# Patient Record
Sex: FEMALE | Race: WHITE | NOT HISPANIC OR LATINO
[De-identification: names, ages, dates, MRNs, and addresses within clinical notes are randomized per-mention and may not be internally consistent; named-entity substitution may affect disease eponyms.]

---

## 2020-11-09 ENCOUNTER — TRANSCRIPTION ENCOUNTER (OUTPATIENT)
Age: 35
End: 2020-11-09

## 2022-07-07 PROBLEM — Z00.00 ENCOUNTER FOR PREVENTIVE HEALTH EXAMINATION: Status: ACTIVE | Noted: 2022-07-07

## 2022-07-25 ENCOUNTER — ASOB RESULT (OUTPATIENT)
Age: 37
End: 2022-07-25

## 2022-07-25 ENCOUNTER — APPOINTMENT (OUTPATIENT)
Dept: ANTEPARTUM | Facility: CLINIC | Age: 37
End: 2022-07-25

## 2022-07-25 PROCEDURE — 76811 OB US DETAILED SNGL FETUS: CPT

## 2022-08-04 ENCOUNTER — OUTPATIENT (OUTPATIENT)
Dept: OUTPATIENT SERVICES | Age: 37
LOS: 1 days | Discharge: HOME | End: 2022-08-04

## 2022-08-08 ENCOUNTER — APPOINTMENT (OUTPATIENT)
Dept: PEDIATRIC CARDIOLOGY | Facility: CLINIC | Age: 37
End: 2022-08-08

## 2022-08-08 PROCEDURE — 76825 ECHO EXAM OF FETAL HEART: CPT

## 2022-08-08 PROCEDURE — 93325 DOPPLER ECHO COLOR FLOW MAPG: CPT

## 2022-08-08 PROCEDURE — 76827 ECHO EXAM OF FETAL HEART: CPT

## 2022-11-18 ENCOUNTER — OUTPATIENT (OUTPATIENT)
Dept: OUTPATIENT SERVICES | Facility: HOSPITAL | Age: 37
LOS: 1 days | End: 2022-11-18
Payer: COMMERCIAL

## 2022-11-18 VITALS
RESPIRATION RATE: 20 BRPM | TEMPERATURE: 99 F | HEIGHT: 62 IN | SYSTOLIC BLOOD PRESSURE: 117 MMHG | DIASTOLIC BLOOD PRESSURE: 78 MMHG | HEART RATE: 90 BPM | OXYGEN SATURATION: 98 % | WEIGHT: 186.07 LBS

## 2022-11-18 DIAGNOSIS — Z33.1 PREGNANT STATE, INCIDENTAL: ICD-10-CM

## 2022-11-18 DIAGNOSIS — Z98.891 HISTORY OF UTERINE SCAR FROM PREVIOUS SURGERY: Chronic | ICD-10-CM

## 2022-11-18 DIAGNOSIS — Z34.90 ENCOUNTER FOR SUPERVISION OF NORMAL PREGNANCY, UNSPECIFIED, UNSPECIFIED TRIMESTER: ICD-10-CM

## 2022-11-18 DIAGNOSIS — Z29.9 ENCOUNTER FOR PROPHYLACTIC MEASURES, UNSPECIFIED: ICD-10-CM

## 2022-11-18 LAB
ANION GAP SERPL CALC-SCNC: 10 MMOL/L — SIGNIFICANT CHANGE UP (ref 5–17)
BLD GP AB SCN SERPL QL: NEGATIVE — SIGNIFICANT CHANGE UP
BUN SERPL-MCNC: 8 MG/DL — SIGNIFICANT CHANGE UP (ref 7–23)
CALCIUM SERPL-MCNC: 9.2 MG/DL — SIGNIFICANT CHANGE UP (ref 8.4–10.5)
CHLORIDE SERPL-SCNC: 105 MMOL/L — SIGNIFICANT CHANGE UP (ref 96–108)
CO2 SERPL-SCNC: 21 MMOL/L — LOW (ref 22–31)
CREAT SERPL-MCNC: 0.36 MG/DL — LOW (ref 0.5–1.3)
EGFR: 133 ML/MIN/1.73M2 — SIGNIFICANT CHANGE UP
GLUCOSE SERPL-MCNC: 65 MG/DL — LOW (ref 70–99)
HCT VFR BLD CALC: 35.3 % — SIGNIFICANT CHANGE UP (ref 34.5–45)
HGB BLD-MCNC: 11.1 G/DL — LOW (ref 11.5–15.5)
MCHC RBC-ENTMCNC: 26.6 PG — LOW (ref 27–34)
MCHC RBC-ENTMCNC: 31.4 GM/DL — LOW (ref 32–36)
MCV RBC AUTO: 84.4 FL — SIGNIFICANT CHANGE UP (ref 80–100)
NRBC # BLD: 0 /100 WBCS — SIGNIFICANT CHANGE UP (ref 0–0)
PLATELET # BLD AUTO: 320 K/UL — SIGNIFICANT CHANGE UP (ref 150–400)
POTASSIUM SERPL-MCNC: 4.4 MMOL/L — SIGNIFICANT CHANGE UP (ref 3.5–5.3)
POTASSIUM SERPL-SCNC: 4.4 MMOL/L — SIGNIFICANT CHANGE UP (ref 3.5–5.3)
RBC # BLD: 4.18 M/UL — SIGNIFICANT CHANGE UP (ref 3.8–5.2)
RBC # FLD: 13.4 % — SIGNIFICANT CHANGE UP (ref 10.3–14.5)
RH IG SCN BLD-IMP: POSITIVE — SIGNIFICANT CHANGE UP
SODIUM SERPL-SCNC: 136 MMOL/L — SIGNIFICANT CHANGE UP (ref 135–145)
WBC # BLD: 8.88 K/UL — SIGNIFICANT CHANGE UP (ref 3.8–10.5)
WBC # FLD AUTO: 8.88 K/UL — SIGNIFICANT CHANGE UP (ref 3.8–10.5)

## 2022-11-18 PROCEDURE — 85027 COMPLETE CBC AUTOMATED: CPT

## 2022-11-18 PROCEDURE — 86850 RBC ANTIBODY SCREEN: CPT

## 2022-11-18 PROCEDURE — 80048 BASIC METABOLIC PNL TOTAL CA: CPT

## 2022-11-18 PROCEDURE — 86901 BLOOD TYPING SEROLOGIC RH(D): CPT

## 2022-11-18 PROCEDURE — 36415 COLL VENOUS BLD VENIPUNCTURE: CPT

## 2022-11-18 PROCEDURE — G0463: CPT

## 2022-11-18 PROCEDURE — 86900 BLOOD TYPING SEROLOGIC ABO: CPT

## 2022-11-18 RX ORDER — CHLORHEXIDINE GLUCONATE 213 G/1000ML
1 SOLUTION TOPICAL ONCE
Refills: 0 | Status: DISCONTINUED | OUTPATIENT
Start: 2022-12-07 | End: 2022-12-09

## 2022-11-18 NOTE — OB PST NOTE - ASSESSMENT
CAPRINI SCORE [CLOT updated 18]    AGE RELATED RISK FACTORS                                                       MOBILITY RELATED FACTORS  [ ] Age 41-60 years                                            (1 Point)                    [ ] Bed rest                                                        (1 Point)  [ ] Age: 61-74 years                                           (2 Points)                  [ ] Plaster cast                                                   (2 Points)  [ ] Age= 75 years                                              (3 Points)                    [ ] Bed bound for more than 72 hours                 (2 Points)    DISEASE RELATED RISK FACTORS                                               GENDER SPECIFIC FACTORS  [ ] Edema in the lower extremities                       (1 Point)              [ 1] Pregnancy                                                     (1 Point)  [ ] Varicose veins                                               (1 Point)                     [ ] Post-partum < 6 weeks                                   (1 Point)             [1 ] BMI > 25 Kg/m2                                            (1 Point)                     [ ] Hormonal therapy  or oral contraception          (1 Point)                 [ ] Sepsis (in the previous month)                        (1 Point)               [ ] History of pregnancy complications                 (1 point)  [ ] Pneumonia or serious lung disease                                               [ ] Unexplained or recurrent                     (1 Point)           (in the previous month)                               (1 Point)  [ ] Abnormal pulmonary function test                     (1 Point)                 SURGERY RELATED RISK FACTORS  [ ] Acute myocardial infarction                              (1 Point)               [ 1]  Section                                             (1 Point)  [ ] Congestive heart failure (in the previous month)  (1 Point)      [ ] Minor surgery                                                  (1 Point)   [ ] Inflammatory bowel disease                             (1 Point)               [ ] Arthroscopic surgery                                        (2 Points)  [ ] Central venous access                                      (2 Points)                [2 ] General surgery lasting more than 45 minutes (2 points)  [ ] Present or previous malignancy                     (2 Points)                [ ] Elective arthroplasty                                         (5 points)    [ ] Stroke (in the previous month)                          (5 Points)                                                                                                                                                           HEMATOLOGY RELATED FACTORS                                                 TRAUMA RELATED RISK FACTORS  [ ] Prior episodes of VTE                                     (3 Points)                [ ] Fracture of the hip, pelvis, or leg                       (5 Points)  [ ] Positive family history for VTE                         (3 Points)             [ ] Acute spinal cord injury (in the previous month)  (5 Points)  [ ] Prothrombin 28764 A                                     (3 Points)               [ ] Paralysis  (less than 1 month)                             (5 Points)  [ ] Factor V Leiden                                             (3 Points)                  [ ] Multiple Trauma within 1 month                        (5 Points)  [ ] Lupus anticoagulants                                     (3 Points)                                                           [ ] Anticardiolipin antibodies                               (3 Points)                                                       [ ] High homocysteine in the blood                      (3 Points)                                             [ ] Other congenital or acquired thrombophilia      (3 Points)                                                [ ] Heparin induced thrombocytopenia                  (3 Points)                                     Total Score [ 5]

## 2022-11-18 NOTE — OB PST NOTE - HISTORY OF PRESENT ILLNESS
36 year old female presents for preop testing for scheduled . She is currently 37 weeks gestation, reports feeling baby movement, last OB visit on 2022. She denies any vaginal spotting or bleeding, c/o mild jayne amaya contractions. Last apt prior to delivery have not been scheduled. She denies any covid-19 symptoms, no recent travel or known exposure.  Patient is scheduled for covid-19 PCR test as required prior to surgical date.

## 2022-11-18 NOTE — OB PST NOTE - NSICDXPASTMEDICALHX_GEN_ALL_CORE_FT
PAST MEDICAL HISTORY:  Currently pregnant     H/O placenta previa 2019    History of hemorrhoids

## 2022-11-18 NOTE — OB PST NOTE - PROBLEM SELECTOR PLAN 1
Scheduled for repeat    pt provided with verbal and written preop instruction, verbalized understanding and teach back

## 2022-11-28 ENCOUNTER — OUTPATIENT (OUTPATIENT)
Dept: OUTPATIENT SERVICES | Facility: HOSPITAL | Age: 37
LOS: 1 days | End: 2022-11-28
Payer: COMMERCIAL

## 2022-11-28 VITALS
RESPIRATION RATE: 18 BRPM | DIASTOLIC BLOOD PRESSURE: 72 MMHG | SYSTOLIC BLOOD PRESSURE: 132 MMHG | HEART RATE: 101 BPM | TEMPERATURE: 99 F

## 2022-11-28 VITALS
RESPIRATION RATE: 18 BRPM | TEMPERATURE: 98 F | HEART RATE: 94 BPM | SYSTOLIC BLOOD PRESSURE: 120 MMHG | DIASTOLIC BLOOD PRESSURE: 80 MMHG

## 2022-11-28 DIAGNOSIS — Z3A.00 WEEKS OF GESTATION OF PREGNANCY NOT SPECIFIED: ICD-10-CM

## 2022-11-28 DIAGNOSIS — O26.899 OTHER SPECIFIED PREGNANCY RELATED CONDITIONS, UNSPECIFIED TRIMESTER: ICD-10-CM

## 2022-11-28 DIAGNOSIS — Z98.891 HISTORY OF UTERINE SCAR FROM PREVIOUS SURGERY: Chronic | ICD-10-CM

## 2022-11-28 PROBLEM — Z34.90 ENCOUNTER FOR SUPERVISION OF NORMAL PREGNANCY, UNSPECIFIED, UNSPECIFIED TRIMESTER: Chronic | Status: ACTIVE | Noted: 2022-11-18

## 2022-11-28 PROBLEM — Z87.19 PERSONAL HISTORY OF OTHER DISEASES OF THE DIGESTIVE SYSTEM: Chronic | Status: ACTIVE | Noted: 2022-11-18

## 2022-11-28 PROBLEM — Z87.59 PERSONAL HISTORY OF OTHER COMPLICATIONS OF PREGNANCY, CHILDBIRTH AND THE PUERPERIUM: Chronic | Status: ACTIVE | Noted: 2022-11-18

## 2022-11-28 PROCEDURE — 99213 OFFICE O/P EST LOW 20 MIN: CPT

## 2022-11-28 PROCEDURE — 59025 FETAL NON-STRESS TEST: CPT

## 2022-11-28 PROCEDURE — 59025 FETAL NON-STRESS TEST: CPT | Mod: 26

## 2022-11-28 PROCEDURE — G0463: CPT

## 2022-11-28 NOTE — OB PROVIDER TRIAGE NOTE - NSOBPROVIDERNOTE_OBGYN_ALL_OB_FT
A/P:  36y  @38wks and 3 days gestation presenting for monitoring due to deceleration noted on NST in office. BPP 8/8. +FM  1: Deceleration on NST  -Prolonged monitoring     To be d/w Arnaldo Rivas PA-C A/P:  36y  @38wks and 3 days gestation presenting for monitoring due to deceleration noted on NST in office. BPP . +FM  1: Deceleration on NST  -Prolonged monitoring     To be d/w Arnaldo Rivas PA-C  ------------------------  PA Addendum:  Discussed pt with Arnaldo Pearce. Pt monitored for 2 hours with reactive NST. BPP in office .   Patient to be d/c home with labor precautions. Patient to return if she experiences ctx, LOF, VB or decreased FM.  Patient to f/u in OB office   D/w Arnaldo Rivas PA-C A/P:  36y  @38wks and 3 days gestation presenting for monitoring due to deceleration noted on NST in office. BPP . +FM  1: Deceleration on NST  -Prolonged monitoring     To be d/w Arnaldo Rivas PA-C  ------------------------  PA Addendum:  Discussed pt with Arnaldo Pearce. Pt monitored for 2 hours with reactive NST. BPP in office .   Patient to be d/c home with labor precautions. Patient to return if she experiences ctx, LOF, VB or decreased FM.  Patient to f/u in OB office at the end of the week.  D/w Arnaldo Rivas PA-C

## 2022-11-28 NOTE — OB PROVIDER TRIAGE NOTE - NSHPPHYSICALEXAM_GEN_ALL_CORE
Vital Signs Last 24 Hrs  T(C): 37.1 (28 Nov 2022 13:31), Max: 37.1 (28 Nov 2022 13:19)  T(F): 98.8 (28 Nov 2022 13:31), Max: 98.8 (28 Nov 2022 13:31)  HR: 99 (28 Nov 2022 13:57) (88 - 107)  BP: 132/72 (28 Nov 2022 13:31) (124/73 - 132/72)  BP(mean): --  RR: 18 (28 Nov 2022 13:31) (18 - 18)  SpO2: 93% (28 Nov 2022 13:57) (93% - 98%)    Parameters below as of 28 Nov 2022 13:31  Patient On (Oxygen Delivery Method): room air    General: NAD, A&Ox3  CV: RRR  Lungs: CTA b/l  Abdomen: Soft, NT, gravid

## 2022-11-28 NOTE — OB PROVIDER TRIAGE NOTE - HISTORY OF PRESENT ILLNESS
PA Note:  36y  @38wks and 3 days gestation presenting for prolonged monitoring. Patient was seen for routine visit in OB office and a deceleration was noted on NST. Patient had a BPP and it was 8/8. Patient denies ctx, LOF or VB. PNC uncomplicated. +FM. GBS -. EFW 7#5 done by ultrasound today. To note, patient is scheduled for rLTCS on .    POBHx: 2019-pLTCS for placenta previa and transverse presentation @36wks gestation, 5#6 (to note baby was born with 1 kidney)  PGYNHx: Denies fibroids, ovarian cysts, abnormal pap smears, STD's  PMHx: Denies  Medications: PNV  Allergies: NKDA  PSHx: c/s  Social Hx: Denies etoh/tobcco/drug use. Denies anxiety/depression    Vital Signs Last 24 Hrs  T(C): 37.1 (2022 13:31), Max: 37.1 (2022 13:19)  T(F): 98.8 (2022 13:31), Max: 98.8 (2022 13:31)  HR: 99 (2022 13:57) (88 - 107)  BP: 132/72 (2022 13:31) (124/73 - 132/72)  BP(mean): --  RR: 18 (2022 13:31) (18 - 18)  SpO2: 93% (2022 13:57) (93% - 98%)    Parameters below as of 2022 13:31  Patient On (Oxygen Delivery Method): room air    General: NAD, A&Ox3  CV: RRR  Lungs: CTA b/l  Abdomen: Soft, NT, gravid    VE: Deferred  EFM: 125/moderate variability/+accels/no decels  Universal: No ctx

## 2022-12-05 ENCOUNTER — OUTPATIENT (OUTPATIENT)
Dept: OUTPATIENT SERVICES | Facility: HOSPITAL | Age: 37
LOS: 1 days | End: 2022-12-05
Payer: COMMERCIAL

## 2022-12-05 DIAGNOSIS — Z98.891 HISTORY OF UTERINE SCAR FROM PREVIOUS SURGERY: Chronic | ICD-10-CM

## 2022-12-05 DIAGNOSIS — Z11.52 ENCOUNTER FOR SCREENING FOR COVID-19: ICD-10-CM

## 2022-12-05 PROCEDURE — U0005: CPT

## 2022-12-05 PROCEDURE — U0003: CPT

## 2022-12-05 PROCEDURE — C9803: CPT

## 2022-12-06 ENCOUNTER — TRANSCRIPTION ENCOUNTER (OUTPATIENT)
Age: 37
End: 2022-12-06

## 2022-12-06 LAB — SARS-COV-2 RNA SPEC QL NAA+PROBE: DETECTED

## 2022-12-07 ENCOUNTER — INPATIENT (INPATIENT)
Facility: HOSPITAL | Age: 37
LOS: 1 days | Discharge: HOME | End: 2022-12-09
Attending: OBSTETRICS & GYNECOLOGY | Admitting: OBSTETRICS & GYNECOLOGY
Payer: COMMERCIAL

## 2022-12-07 VITALS — HEART RATE: 120 BPM | OXYGEN SATURATION: 100 %

## 2022-12-07 DIAGNOSIS — Z3A.39 39 WEEKS GESTATION OF PREGNANCY: ICD-10-CM

## 2022-12-07 DIAGNOSIS — Z98.891 HISTORY OF UTERINE SCAR FROM PREVIOUS SURGERY: Chronic | ICD-10-CM

## 2022-12-07 DIAGNOSIS — Z33.1 PREGNANT STATE, INCIDENTAL: ICD-10-CM

## 2022-12-07 LAB
COVID-19 SPIKE DOMAIN AB INTERP: POSITIVE
COVID-19 SPIKE DOMAIN ANTIBODY RESULT: >250 U/ML — HIGH
HCT VFR BLD CALC: 35.2 % — SIGNIFICANT CHANGE UP (ref 34.5–45)
HGB BLD-MCNC: 11.2 G/DL — LOW (ref 11.5–15.5)
MCHC RBC-ENTMCNC: 26.9 PG — LOW (ref 27–34)
MCHC RBC-ENTMCNC: 31.8 GM/DL — LOW (ref 32–36)
MCV RBC AUTO: 84.6 FL — SIGNIFICANT CHANGE UP (ref 80–100)
NRBC # BLD: 0 /100 WBCS — SIGNIFICANT CHANGE UP (ref 0–0)
PLATELET # BLD AUTO: 327 K/UL — SIGNIFICANT CHANGE UP (ref 150–400)
RBC # BLD: 4.16 M/UL — SIGNIFICANT CHANGE UP (ref 3.8–5.2)
RBC # FLD: 14.4 % — SIGNIFICANT CHANGE UP (ref 10.3–14.5)
SARS-COV-2 IGG+IGM SERPL QL IA: >250 U/ML — HIGH
SARS-COV-2 IGG+IGM SERPL QL IA: POSITIVE
T PALLIDUM AB TITR SER: NEGATIVE — SIGNIFICANT CHANGE UP
WBC # BLD: 10.34 K/UL — SIGNIFICANT CHANGE UP (ref 3.8–10.5)
WBC # FLD AUTO: 10.34 K/UL — SIGNIFICANT CHANGE UP (ref 3.8–10.5)

## 2022-12-07 PROCEDURE — 59514 CESAREAN DELIVERY ONLY: CPT | Mod: AS,U9

## 2022-12-07 DEVICE — INTERCEED 5 X 6" XL: Type: IMPLANTABLE DEVICE | Status: FUNCTIONAL

## 2022-12-07 RX ORDER — MORPHINE SULFATE 50 MG/1
0.1 CAPSULE, EXTENDED RELEASE ORAL ONCE
Refills: 0 | Status: DISCONTINUED | OUTPATIENT
Start: 2022-12-07 | End: 2022-12-08

## 2022-12-07 RX ORDER — OXYCODONE HYDROCHLORIDE 5 MG/1
5 TABLET ORAL
Refills: 0 | Status: DISCONTINUED | OUTPATIENT
Start: 2022-12-07 | End: 2022-12-09

## 2022-12-07 RX ORDER — OXYCODONE HYDROCHLORIDE 5 MG/1
10 TABLET ORAL
Refills: 0 | Status: DISCONTINUED | OUTPATIENT
Start: 2022-12-07 | End: 2022-12-08

## 2022-12-07 RX ORDER — OXYCODONE HYDROCHLORIDE 5 MG/1
5 TABLET ORAL ONCE
Refills: 0 | Status: DISCONTINUED | OUTPATIENT
Start: 2022-12-07 | End: 2022-12-09

## 2022-12-07 RX ORDER — OXYTOCIN 10 UNIT/ML
333.33 VIAL (ML) INJECTION
Qty: 20 | Refills: 0 | Status: DISCONTINUED | OUTPATIENT
Start: 2022-12-07 | End: 2022-12-07

## 2022-12-07 RX ORDER — ONDANSETRON 8 MG/1
4 TABLET, FILM COATED ORAL EVERY 6 HOURS
Refills: 0 | Status: DISCONTINUED | OUTPATIENT
Start: 2022-12-07 | End: 2022-12-08

## 2022-12-07 RX ORDER — SODIUM CHLORIDE 9 MG/ML
1000 INJECTION, SOLUTION INTRAVENOUS
Refills: 0 | Status: DISCONTINUED | OUTPATIENT
Start: 2022-12-07 | End: 2022-12-07

## 2022-12-07 RX ORDER — IBUPROFEN 200 MG
600 TABLET ORAL EVERY 6 HOURS
Refills: 0 | Status: COMPLETED | OUTPATIENT
Start: 2022-12-07 | End: 2023-11-05

## 2022-12-07 RX ORDER — CITRIC ACID/SODIUM CITRATE 300-500 MG
15 SOLUTION, ORAL ORAL ONCE
Refills: 0 | Status: COMPLETED | OUTPATIENT
Start: 2022-12-07 | End: 2022-12-07

## 2022-12-07 RX ORDER — SODIUM CHLORIDE 9 MG/ML
1000 INJECTION, SOLUTION INTRAVENOUS
Refills: 0 | Status: DISCONTINUED | OUTPATIENT
Start: 2022-12-07 | End: 2022-12-09

## 2022-12-07 RX ORDER — DIPHENHYDRAMINE HCL 50 MG
25 CAPSULE ORAL EVERY 6 HOURS
Refills: 0 | Status: DISCONTINUED | OUTPATIENT
Start: 2022-12-07 | End: 2022-12-09

## 2022-12-07 RX ORDER — OXYTOCIN 10 UNIT/ML
333.33 VIAL (ML) INJECTION
Qty: 20 | Refills: 0 | Status: DISCONTINUED | OUTPATIENT
Start: 2022-12-07 | End: 2022-12-09

## 2022-12-07 RX ORDER — SODIUM CHLORIDE 9 MG/ML
1000 INJECTION, SOLUTION INTRAVENOUS ONCE
Refills: 0 | Status: DISCONTINUED | OUTPATIENT
Start: 2022-12-07 | End: 2022-12-07

## 2022-12-07 RX ORDER — ACETAMINOPHEN 500 MG
1000 TABLET ORAL ONCE
Refills: 0 | Status: COMPLETED | OUTPATIENT
Start: 2022-12-07 | End: 2022-12-07

## 2022-12-07 RX ORDER — HEPARIN SODIUM 5000 [USP'U]/ML
5000 INJECTION INTRAVENOUS; SUBCUTANEOUS EVERY 12 HOURS
Refills: 0 | Status: DISCONTINUED | OUTPATIENT
Start: 2022-12-07 | End: 2022-12-09

## 2022-12-07 RX ORDER — OXYCODONE HYDROCHLORIDE 5 MG/1
5 TABLET ORAL
Refills: 0 | Status: DISCONTINUED | OUTPATIENT
Start: 2022-12-07 | End: 2022-12-08

## 2022-12-07 RX ORDER — KETOROLAC TROMETHAMINE 30 MG/ML
30 SYRINGE (ML) INJECTION EVERY 6 HOURS
Refills: 0 | Status: DISCONTINUED | OUTPATIENT
Start: 2022-12-07 | End: 2022-12-09

## 2022-12-07 RX ORDER — DEXAMETHASONE 0.5 MG/5ML
4 ELIXIR ORAL EVERY 6 HOURS
Refills: 0 | Status: DISCONTINUED | OUTPATIENT
Start: 2022-12-07 | End: 2022-12-08

## 2022-12-07 RX ORDER — CEFAZOLIN SODIUM 1 G
2000 VIAL (EA) INJECTION ONCE
Refills: 0 | Status: COMPLETED | OUTPATIENT
Start: 2022-12-07 | End: 2022-12-07

## 2022-12-07 RX ORDER — ACETAMINOPHEN 500 MG
975 TABLET ORAL
Refills: 0 | Status: DISCONTINUED | OUTPATIENT
Start: 2022-12-07 | End: 2022-12-09

## 2022-12-07 RX ORDER — TETANUS TOXOID, REDUCED DIPHTHERIA TOXOID AND ACELLULAR PERTUSSIS VACCINE, ADSORBED 5; 2.5; 8; 8; 2.5 [IU]/.5ML; [IU]/.5ML; UG/.5ML; UG/.5ML; UG/.5ML
0.5 SUSPENSION INTRAMUSCULAR ONCE
Refills: 0 | Status: DISCONTINUED | OUTPATIENT
Start: 2022-12-07 | End: 2022-12-09

## 2022-12-07 RX ORDER — MAGNESIUM HYDROXIDE 400 MG/1
30 TABLET, CHEWABLE ORAL
Refills: 0 | Status: DISCONTINUED | OUTPATIENT
Start: 2022-12-07 | End: 2022-12-09

## 2022-12-07 RX ORDER — FAMOTIDINE 10 MG/ML
20 INJECTION INTRAVENOUS ONCE
Refills: 0 | Status: COMPLETED | OUTPATIENT
Start: 2022-12-07 | End: 2022-12-07

## 2022-12-07 RX ORDER — LANOLIN
1 OINTMENT (GRAM) TOPICAL EVERY 6 HOURS
Refills: 0 | Status: DISCONTINUED | OUTPATIENT
Start: 2022-12-07 | End: 2022-12-09

## 2022-12-07 RX ORDER — NALBUPHINE HYDROCHLORIDE 10 MG/ML
2.5 INJECTION, SOLUTION INTRAMUSCULAR; INTRAVENOUS; SUBCUTANEOUS EVERY 6 HOURS
Refills: 0 | Status: DISCONTINUED | OUTPATIENT
Start: 2022-12-07 | End: 2022-12-08

## 2022-12-07 RX ORDER — SIMETHICONE 80 MG/1
80 TABLET, CHEWABLE ORAL EVERY 4 HOURS
Refills: 0 | Status: DISCONTINUED | OUTPATIENT
Start: 2022-12-07 | End: 2022-12-09

## 2022-12-07 RX ORDER — NALOXONE HYDROCHLORIDE 4 MG/.1ML
0.1 SPRAY NASAL
Refills: 0 | Status: DISCONTINUED | OUTPATIENT
Start: 2022-12-07 | End: 2022-12-08

## 2022-12-07 RX ADMIN — Medication 400 MILLIGRAM(S): at 14:22

## 2022-12-07 RX ADMIN — Medication 30 MILLIGRAM(S): at 18:50

## 2022-12-07 RX ADMIN — Medication 30 MILLIGRAM(S): at 18:31

## 2022-12-07 RX ADMIN — Medication 975 MILLIGRAM(S): at 20:05

## 2022-12-07 RX ADMIN — FAMOTIDINE 20 MILLIGRAM(S): 10 INJECTION INTRAVENOUS at 10:10

## 2022-12-07 RX ADMIN — HEPARIN SODIUM 5000 UNIT(S): 5000 INJECTION INTRAVENOUS; SUBCUTANEOUS at 18:31

## 2022-12-07 RX ADMIN — Medication 30 MILLIGRAM(S): at 23:59

## 2022-12-07 RX ADMIN — Medication 975 MILLIGRAM(S): at 20:35

## 2022-12-07 RX ADMIN — Medication 15 MILLILITER(S): at 10:09

## 2022-12-07 NOTE — OB RN PATIENT PROFILE - FALL HARM RISK - UNIVERSAL INTERVENTIONS
Bed in lowest position, wheels locked, appropriate side rails in place/Call bell, personal items and telephone in reach/Instruct patient to call for assistance before getting out of bed or chair/Non-slip footwear when patient is out of bed/[place] to call system/Physically safe environment - no spills, clutter or unnecessary equipment/Purposeful Proactive Rounding/Room/bathroom lighting operational, light cord in reach

## 2022-12-07 NOTE — OB PROVIDER H&P - PROBLEM SELECTOR PLAN 1
- Admit to L & D/labs/IVF/NPO  - Fetal status - reactive  - GBS negative  - Anesthesia pre-op  - Nursing pre-op  - Pre-op meds  - Consents to be signed  D/W  Dr Jacob Montoya PA-C

## 2022-12-07 NOTE — OB PROVIDER H&P - HISTORY OF PRESENT ILLNESS
37yo      @  39w 5 d     presents for scheduled repeat  section  Denies contractions, VB or LOF has + FM  Pt was covid + two week ago      PNC: Dr James  PNI: uncomplicated  PNL: GBS negative      All: No Known Allergies  Meds: PNV, vit D  PMHx: Denies  PSHx:  section  Socialhx: Denies x 3  OBhx: 2019  36wks C/S secondary to placental previa and malposition of baby  5lbs 6oz  GYNhx: Denies fibroids, ov cysts or STDs    T(C): --  HR: 119 (22 @ 09:19) (116 - 128)  BP: 132/85 (22 @ 09:02) (132/85 - 132/85)  RR: 18 (22 @ 09:02) (18 - 18)  SpO2: 98% (22 @ 09:19) (98% - 100%)    Gen: NAD  Heart: RRR  Lungs: CTA B/L  Abdomen: Gravid, NT  Ext: no calf tenderness    NST:140's moderate variability + accels no decels  TOCO: irregular  VE: deferred  EFW:  3600

## 2022-12-07 NOTE — OB PROVIDER H&P - NS_PRENATALLABSOURCEGBSBACTPN_OBGYN_ALL_OB
Radiofrequency Lesioning, Care After  This sheet gives you information about how to care for yourself after your procedure. Your health care provider may also give you more specific instructions. If you have problems or questions, contact your health care provider.  What can I expect after the procedure?  After the procedure, it is common to have:  · Slight pain in the area where the procedure was done.  · Temporary numbness.  Follow these instructions at home:    Medicines  · Take over-the-counter and prescription medicines only as told by your health care provider.  · Do not drive for 24 hours if you were given a sedative during your procedure.  · Do not drive or use heavy machinery while taking prescription pain medicine.  Insertion site care    · Remove the bandage (dressing) after 24 hours or as directed by your health care provider.  · Check your needle insertion site every day for signs of infection. Watch for:  ? Redness, swelling, or pain.  ? Fluid or blood.  ? Warmth.  ? Pus or a bad smell.  Managing pain    · If directed, put ice on the painful area.  ? Put ice in a plastic bag.  ? Place a towel between your skin and the bag.  ? Leave the ice on for 20 minutes, 2-3 times a day.  General instructions  · Return to your normal activities as told by your health care provider. Ask your health care provider what activities are safe for you.  · Pay close attention to how you feel after the procedure. If you start to have pain, write down when it hurts and how it feels. This will help you and your health care provider know if you need an additional treatment.  · Keep all follow-up visits as told by your health care provider. This is important.  Contact a health care provider if you have:  · Pain that does not get better.  · Redness, warmth, swelling, or pain at the needle insertion site.  · Fluid, blood, or pus coming from the needle insertion site.  · A fever.  Get help right away if you develop:  · Sudden,  severe pain.  · Numbness or tingling near the insertion site and those symptoms do not go away.  Summary  · After the procedure, it is common to have slight pain and temporary numbness in the area where the procedure was done.  · Do not drive for 24 hours if you were given a sedative during your procedure.  · Pay close attention to how you feel after the procedure. If you start to have pain, write down when it hurts and how it feels. This will help you and your health care provider know if you need an additional treatment.  · Contact a health care provider if you have a fever or pain that does not get better, or if you notice redness, warmth, swelling, pain, fluid, blood, or pus at the insertion site.  · Get help right away if you develop sudden, severe pain, or numbness or tingling near the insertion site.  This information is not intended to replace advice given to you by your health care provider. Make sure you discuss any questions you have with your health care provider.  Document Released: 08/16/2012 Document Revised: 09/05/2019 Document Reviewed: 09/05/2019  Elsevier Patient Education © 2020 [employer]     unknown

## 2022-12-07 NOTE — OB PROVIDER H&P - NSLOWPPHRISK_OBGYN_A_OB
[place] Pregnancy/Less than or equal to 4 previous vaginal births/No known bleeding disorder/No history of postpartum hemorrhage

## 2022-12-07 NOTE — PRE-ANESTHESIA EVALUATION ADULT - NSANTHPMHFT_GEN_ALL_CORE
s/p primary C/S (vasoprevia) at [facility], CSE w/o cx's  denies other signif. PMH/PSH  Covid infection 11.23.22, 4  day hx/o cough/HA

## 2022-12-07 NOTE — OB RN PATIENT PROFILE - NS PRO RUBELLA RECEIVED Y/N
----- Message from Osmar Morgan MD sent at 8/13/2019  7:47 AM CDT -----  Please inform the patient of normalresults.   no...

## 2022-12-07 NOTE — OB RN DELIVERY SUMMARY - NS_SEPSISRSKCALC_OBGYN_ALL_OB_FT
EOS calculated successfully. EOS Risk Factor: 0.5/1000 live births ([facility] national incidence); GA=39w5d; Temp=98.2; ROM=0.017; GBS='Negative'; Antibiotics='No antibiotics or any antibiotics < 2 hrs prior to birth'

## 2022-12-07 NOTE — OB RN DELIVERY SUMMARY - NSSELHIDDEN_OBGYN_ALL_OB_FT
[NS_DeliveryAttending1_OBGYN_ALL_OB_FT:[email or web address]],[NS_DeliveryRN_OBGYN_ALL_OB_FT:[email or web address]=]

## 2022-12-07 NOTE — OB PROVIDER DELIVERY SUMMARY - NSPROVIDERDELIVERYNOTE_OBGYN_ALL_OB_FT
scheduled rLTCS   Viable male infant, Apgars 8/9, weight 3460g  Hysterotomy closed in 1 layer using PDS  Intercede placed over the anterior surface of the uterus  Grossly normal uterus, tubes, and ovaries  Abdomen closed in standard fashion  Pt and infant to recovery in stable condition  EBL:  500  IVF: 1400     UOP: 400  Dictation#: scheduled rLTCS   Viable male infant, Apgars 8/9, weight 3460g  Hysterotomy closed in 1 layer using PDS  Intercede placed over the anterior surface of the uterus  Grossly normal uterus, tubes, and ovaries  Abdomen closed in standard fashion  Pt and infant to recovery in stable condition  EBL:  500  IVF: 1400     UOP: 400  Dictation#: [id number]

## 2022-12-08 ENCOUNTER — TRANSCRIPTION ENCOUNTER (OUTPATIENT)
Age: 37
End: 2022-12-08

## 2022-12-08 LAB
BASOPHILS # BLD AUTO: 0.02 K/UL — SIGNIFICANT CHANGE UP (ref 0–0.2)
BASOPHILS NFR BLD AUTO: 0.1 % — SIGNIFICANT CHANGE UP (ref 0–2)
EOSINOPHIL # BLD AUTO: 0.02 K/UL — SIGNIFICANT CHANGE UP (ref 0–0.5)
EOSINOPHIL NFR BLD AUTO: 0.1 % — SIGNIFICANT CHANGE UP (ref 0–6)
HCT VFR BLD CALC: 30.9 % — LOW (ref 34.5–45)
HGB BLD-MCNC: 9.8 G/DL — LOW (ref 11.5–15.5)
IMM GRANULOCYTES NFR BLD AUTO: 0.6 % — SIGNIFICANT CHANGE UP (ref 0–0.9)
LYMPHOCYTES # BLD AUTO: 2.89 K/UL — SIGNIFICANT CHANGE UP (ref 1–3.3)
LYMPHOCYTES # BLD AUTO: 20 % — SIGNIFICANT CHANGE UP (ref 13–44)
MCHC RBC-ENTMCNC: 27 PG — SIGNIFICANT CHANGE UP (ref 27–34)
MCHC RBC-ENTMCNC: 31.7 GM/DL — LOW (ref 32–36)
MCV RBC AUTO: 85.1 FL — SIGNIFICANT CHANGE UP (ref 80–100)
MONOCYTES # BLD AUTO: 1.01 K/UL — HIGH (ref 0–0.9)
MONOCYTES NFR BLD AUTO: 7 % — SIGNIFICANT CHANGE UP (ref 2–14)
NEUTROPHILS # BLD AUTO: 10.39 K/UL — HIGH (ref 1.8–7.4)
NEUTROPHILS NFR BLD AUTO: 72.2 % — SIGNIFICANT CHANGE UP (ref 43–77)
NRBC # BLD: 0 /100 WBCS — SIGNIFICANT CHANGE UP (ref 0–0)
PLATELET # BLD AUTO: 306 K/UL — SIGNIFICANT CHANGE UP (ref 150–400)
RBC # BLD: 3.63 M/UL — LOW (ref 3.8–5.2)
RBC # FLD: 14.5 % — SIGNIFICANT CHANGE UP (ref 10.3–14.5)
WBC # BLD: 14.42 K/UL — HIGH (ref 3.8–10.5)
WBC # FLD AUTO: 14.42 K/UL — HIGH (ref 3.8–10.5)

## 2022-12-08 RX ORDER — IBUPROFEN 200 MG
600 TABLET ORAL EVERY 6 HOURS
Refills: 0 | Status: DISCONTINUED | OUTPATIENT
Start: 2022-12-08 | End: 2022-12-09

## 2022-12-08 RX ADMIN — Medication 600 MILLIGRAM(S): at 12:32

## 2022-12-08 RX ADMIN — Medication 600 MILLIGRAM(S): at 18:30

## 2022-12-08 RX ADMIN — Medication 975 MILLIGRAM(S): at 03:05

## 2022-12-08 RX ADMIN — HEPARIN SODIUM 5000 UNIT(S): 5000 INJECTION INTRAVENOUS; SUBCUTANEOUS at 17:59

## 2022-12-08 RX ADMIN — Medication 975 MILLIGRAM(S): at 20:40

## 2022-12-08 RX ADMIN — Medication 975 MILLIGRAM(S): at 15:30

## 2022-12-08 RX ADMIN — Medication 975 MILLIGRAM(S): at 08:57

## 2022-12-08 RX ADMIN — Medication 975 MILLIGRAM(S): at 09:30

## 2022-12-08 RX ADMIN — HEPARIN SODIUM 5000 UNIT(S): 5000 INJECTION INTRAVENOUS; SUBCUTANEOUS at 05:53

## 2022-12-08 RX ADMIN — Medication 30 MILLIGRAM(S): at 06:22

## 2022-12-08 RX ADMIN — Medication 975 MILLIGRAM(S): at 14:53

## 2022-12-08 RX ADMIN — Medication 600 MILLIGRAM(S): at 23:48

## 2022-12-08 RX ADMIN — Medication 30 MILLIGRAM(S): at 00:29

## 2022-12-08 RX ADMIN — Medication 975 MILLIGRAM(S): at 21:20

## 2022-12-08 RX ADMIN — Medication 600 MILLIGRAM(S): at 17:58

## 2022-12-08 RX ADMIN — Medication 600 MILLIGRAM(S): at 13:05

## 2022-12-08 RX ADMIN — Medication 30 MILLIGRAM(S): at 05:52

## 2022-12-08 NOTE — DISCHARGE NOTE OB - NS MD DC FALL RISK RISK
For information on Fall & Injury Prevention, visit: [email or web address] OR  [email or web address] OR  https://www.cdc.gov/steadi/patient.html

## 2022-12-08 NOTE — DISCHARGE NOTE OB - PATIENT PORTAL LINK FT
You can access the FollowMyHealth Patient Portal offered by [facility] by registering at the following website: [email or web address]. By joining [employer]’s FollowMyHealth portal, you will also be able to view your health information using other applications (apps) compatible with our system.

## 2022-12-08 NOTE — PROGRESS NOTE ADULT - SUBJECTIVE AND OBJECTIVE BOX
OB Postpartum Note:  Delivery    S: 35yo now POD #1 s/p rLTCS. Her pain is well controlled. She is tolerating a regular diet but has not yet passed flatus. Voiding spontaneously and ambulating without difficulty. Denies N/V. Denies CP/SOB/lightheadedness/dizziness/headache/epigastric pain/changes in vision.    O:   Vitals:  Vital Signs Last 24 Hrs  T(C): 36.6 (08 Dec 2022 06:34), Max: 36.8 (07 Dec 2022 08:51)  T(F): 97.8 (08 Dec 2022 06:34), Max: 98.2 (07 Dec 2022 08:51)  HR: 80 (08 Dec 2022 06:34) (80 - 128)  BP: 100/66 (08 Dec 2022 06:34) (100/66 - 173/105)  BP(mean): 76 (07 Dec 2022 14:30) (73 - 90)  RR: 18 (08 Dec 2022 06:34) (18 - 29)  SpO2: 98% (08 Dec 2022 06:34) (95% - 100%)    Parameters below as of 08 Dec 2022 06:34  Patient On (Oxygen Delivery Method): room air        MEDICATIONS  (STANDING):  acetaminophen     Tablet .. 975 milliGRAM(s) Oral <User Schedule>  chlorhexidine 2% Cloths 1 Application(s) Topical once  diphtheria/tetanus/pertussis (acellular) Vaccine (Adacel) 0.5 milliLiter(s) IntraMuscular once  heparin   Injectable 5000 Unit(s) SubCutaneous every 12 hours  ibuprofen  Tablet. 600 milliGRAM(s) Oral every 6 hours  ketorolac   Injectable 30 milliGRAM(s) IV Push every 6 hours  lactated ringers. 1000 milliLiter(s) (125 mL/Hr) IV Continuous <Continuous>  morphine PF Spinal 0.1 milliGRAM(s) IntraThecal. once  oxytocin Infusion 333.333 milliUNIT(s)/Min (1000 mL/Hr) IV Continuous <Continuous>    MEDICATIONS  (PRN):  dexAMETHasone  Injectable 4 milliGRAM(s) IV Push every 6 hours PRN Nausea  diphenhydrAMINE 25 milliGRAM(s) Oral every 6 hours PRN Pruritus  lanolin Ointment 1 Application(s) Topical every 6 hours PRN Sore Nipples  magnesium hydroxide Suspension 30 milliLiter(s) Oral two times a day PRN Constipation  nalbuphine Injectable 2.5 milliGRAM(s) IV Push every 6 hours PRN Pruritus  naloxone Injectable 0.1 milliGRAM(s) IV Push every 3 minutes PRN For ANY of the following changes in patient status:  A. Breaths Per Minute LESS THAN 10, B. Oxygen saturation LESS THAN 90%, C. Sedation score of 6 for Stop After: 4 Times  ondansetron Injectable 4 milliGRAM(s) IV Push every 6 hours PRN Nausea  oxyCODONE    IR 5 milliGRAM(s) Oral every 3 hours PRN Mild Pain (1 - 3)  oxyCODONE    IR 10 milliGRAM(s) Oral every 3 hours PRN Moderate Pain (4 - 6)  oxyCODONE    IR 5 milliGRAM(s) Oral every 3 hours PRN Moderate to Severe Pain (4-10)  oxyCODONE    IR 5 milliGRAM(s) Oral once PRN Moderate to Severe Pain (4-10)  simethicone 80 milliGRAM(s) Chew every 4 hours PRN Gas      Labs:  Blood type: B Positive  Rubella IgG: RPR: Negative                          9.8<L>   14.42<H> >-----------< 306    (  @ 06:39 )             30.9<L>                        11.2<L>   10.34 >-----------< 327    (  @ 09:10 )             35.2                  PE:  General: NAD, patient resting comfortably in bed  Abdomen: Mildly distended, appropriately tender. Fundus firm.  Incision: Clean, dry, intact.  : appropriate amount of lochia on pad  Extremities: SCDs in place, no erythema

## 2022-12-08 NOTE — PROGRESS NOTE ADULT - ASSESSMENT
A/P: 35yo POD #1 s/p rLTCS.  Patient is stable and doing well post-operatively.      #Postpartum recovery from  section,   - Continue regular diet.  - Increase ambulation.  - PO pain medication with Tylenol, Motrin and Oxycodone PRN for pain control.    - POD #1 CBC this morning: H/H 9.8/30.9, appropriate drop for EBL  - DVT prophylaxis with Heparin 5000u BID    Yen Wharton PGY1

## 2022-12-08 NOTE — DISCHARGE NOTE OB - CARE PROVIDER_API CALL
Corey James)  Obstetrics and Gynecology  [address] [place] [address], [address]  [place] [address]  Phone: [phone]  Fax: [phone]  Follow Up Time:

## 2022-12-08 NOTE — PROGRESS NOTE ADULT - SUBJECTIVE AND OBJECTIVE BOX
Day 1 of Anesthesia Pain Management Service    SUBJECTIVE: Doing ok  Pain Scale Score:          [X] Refer to charted pain scores    THERAPY:    s/p    100 mcg PF morphine on 12\7\2022       MEDICATIONS  (STANDING):  acetaminophen     Tablet .. 975 milliGRAM(s) Oral <User Schedule>  chlorhexidine 2% Cloths 1 Application(s) Topical once  diphtheria/tetanus/pertussis (acellular) Vaccine (Adacel) 0.5 milliLiter(s) IntraMuscular once  heparin   Injectable 5000 Unit(s) SubCutaneous every 12 hours  ibuprofen  Tablet. 600 milliGRAM(s) Oral every 6 hours  ketorolac   Injectable 30 milliGRAM(s) IV Push every 6 hours  lactated ringers. 1000 milliLiter(s) (125 mL/Hr) IV Continuous <Continuous>  morphine PF Spinal 0.1 milliGRAM(s) IntraThecal. once  oxytocin Infusion 333.333 milliUNIT(s)/Min (1000 mL/Hr) IV Continuous <Continuous>    MEDICATIONS  (PRN):  dexAMETHasone  Injectable 4 milliGRAM(s) IV Push every 6 hours PRN Nausea  diphenhydrAMINE 25 milliGRAM(s) Oral every 6 hours PRN Pruritus  lanolin Ointment 1 Application(s) Topical every 6 hours PRN Sore Nipples  magnesium hydroxide Suspension 30 milliLiter(s) Oral two times a day PRN Constipation  nalbuphine Injectable 2.5 milliGRAM(s) IV Push every 6 hours PRN Pruritus  naloxone Injectable 0.1 milliGRAM(s) IV Push every 3 minutes PRN For ANY of the following changes in patient status:  A. Breaths Per Minute LESS THAN 10, B. Oxygen saturation LESS THAN 90%, C. Sedation score of 6 for Stop After: 4 Times  ondansetron Injectable 4 milliGRAM(s) IV Push every 6 hours PRN Nausea  oxyCODONE    IR 5 milliGRAM(s) Oral every 3 hours PRN Mild Pain (1 - 3)  oxyCODONE    IR 10 milliGRAM(s) Oral every 3 hours PRN Moderate Pain (4 - 6)  oxyCODONE    IR 5 milliGRAM(s) Oral every 3 hours PRN Moderate to Severe Pain (4-10)  oxyCODONE    IR 5 milliGRAM(s) Oral once PRN Moderate to Severe Pain (4-10)  simethicone 80 milliGRAM(s) Chew every 4 hours PRN Gas      OBJECTIVE:    Sedation:        	[X] Alert	 [ ] Drowsy	[ ] Arousable      [ ] Asleep       [ ] Unresponsive    Side Effects:	[X] None 	[ ] Nausea	[ ] Vomiting         [ ] Pruritus  		[ ] Weakness            [ ] Numbness	          [ ] Other:    Vital Signs Last 24 Hrs  T(C): 36.6 (08 Dec 2022 06:34), Max: 36.8 (07 Dec 2022 15:15)  T(F): 97.8 (08 Dec 2022 06:34), Max: 98.2 (07 Dec 2022 15:15)  HR: 80 (08 Dec 2022 06:34) (80 - 119)  BP: 100/66 (08 Dec 2022 06:34) (100/66 - 173/105)  BP(mean): 76 (07 Dec 2022 14:30) (73 - 90)  RR: 18 (08 Dec 2022 06:34) (18 - 29)  SpO2: 98% (08 Dec 2022 06:34) (95% - 99%)    Parameters below as of 08 Dec 2022 06:34  Patient On (Oxygen Delivery Method): room air        ASSESSMENT/ PLAN  [X] Patient transitioned to prn analgesics  [X] Pain management per primary service, pain service to sign off   [X]Documentation and Verification of current medications

## 2022-12-09 PROCEDURE — 86901 BLOOD TYPING SEROLOGIC RH(D): CPT

## 2022-12-09 PROCEDURE — 86850 RBC ANTIBODY SCREEN: CPT

## 2022-12-09 PROCEDURE — 59050 FETAL MONITOR W/REPORT: CPT

## 2022-12-09 PROCEDURE — C1765: CPT

## 2022-12-09 PROCEDURE — 86780 TREPONEMA PALLIDUM: CPT

## 2022-12-09 PROCEDURE — 59025 FETAL NON-STRESS TEST: CPT

## 2022-12-09 PROCEDURE — 85027 COMPLETE CBC AUTOMATED: CPT

## 2022-12-09 PROCEDURE — 86900 BLOOD TYPING SEROLOGIC ABO: CPT

## 2022-12-09 PROCEDURE — 86769 SARS-COV-2 COVID-19 ANTIBODY: CPT

## 2022-12-09 PROCEDURE — 85025 COMPLETE CBC W/AUTO DIFF WBC: CPT

## 2022-12-09 RX ADMIN — Medication 975 MILLIGRAM(S): at 03:10

## 2022-12-09 RX ADMIN — Medication 600 MILLIGRAM(S): at 05:56

## 2022-12-09 RX ADMIN — Medication 600 MILLIGRAM(S): at 06:30

## 2022-12-09 RX ADMIN — Medication 975 MILLIGRAM(S): at 09:40

## 2022-12-09 RX ADMIN — HEPARIN SODIUM 5000 UNIT(S): 5000 INJECTION INTRAVENOUS; SUBCUTANEOUS at 05:56

## 2022-12-09 RX ADMIN — Medication 600 MILLIGRAM(S): at 12:25

## 2022-12-09 RX ADMIN — Medication 600 MILLIGRAM(S): at 00:30

## 2022-12-09 RX ADMIN — Medication 600 MILLIGRAM(S): at 13:00

## 2022-12-09 RX ADMIN — Medication 975 MILLIGRAM(S): at 02:40

## 2022-12-09 RX ADMIN — Medication 975 MILLIGRAM(S): at 09:07

## 2022-12-09 NOTE — PROGRESS NOTE ADULT - ASSESSMENT
A/P: 37yo POD #2 s/p rLTCS.  Patient is stable and doing well post-operatively.      #Postpartum recovery from  section,   - Continue regular diet.  - Increase ambulation.  - PO pain medication with Tylenol, Motrin and Oxycodone PRN for pain control.    - DVT prophylaxis with Heparin 5000u BID    Denzel Beltran, PGY-1

## 2022-12-09 NOTE — PROGRESS NOTE ADULT - SUBJECTIVE AND OBJECTIVE BOX
R1 Progress Note    Patient seen and examined at bedside, no acute overnight events. No acute complaints, pain well controlled. Patient is ambulating, voiding, and tolerating regular diet. Passing flatus. Denies CP, SOB, N/V, HA, blurred vision, epigastric pain.    Vital Signs Last 24 Hours  T(C): 36.3 (12-09-22 @ 05:05), Max: 36.9 (12-08-22 @ 09:14)  HR: 66 (12-09-22 @ 05:05) (66 - 97)  BP: 106/69 (12-09-22 @ 05:05) (106/69 - 119/81)  RR: 18 (12-09-22 @ 05:05) (18 - 18)  SpO2: 98% (12-09-22 @ 05:05) (96% - 98%)    I&O's Summary    07 Dec 2022 07:01  -  08 Dec 2022 07:00  --------------------------------------------------------  IN: 700 mL / OUT: 3400 mL / NET: -2700 mL        Physical Exam:  General: NAD  Abdomen: Soft, non-tender, non-distended, fundus firm  Incision: Pfannenstiel incision CDI, subcuticular suture closure  Pelvic: Lochia wnl    Labs:    Blood Type: B Positive  Antibody Screen: Negative  RPR: Negative               9.8    14.42 )-----------( 306      ( 12-08 @ 06:39 )             30.9                11.2   10.34 )-----------( 327      ( 12-07 @ 09:10 )             35.2                11.1   8.88  )-----------( 320      ( 11-18 @ 13:26 )             35.3         MEDICATIONS  (STANDING):  acetaminophen     Tablet .. 975 milliGRAM(s) Oral <User Schedule>  chlorhexidine 2% Cloths 1 Application(s) Topical once  diphtheria/tetanus/pertussis (acellular) Vaccine (Adacel) 0.5 milliLiter(s) IntraMuscular once  heparin   Injectable 5000 Unit(s) SubCutaneous every 12 hours  ibuprofen  Tablet. 600 milliGRAM(s) Oral every 6 hours  ketorolac   Injectable 30 milliGRAM(s) IV Push every 6 hours  lactated ringers. 1000 milliLiter(s) (125 mL/Hr) IV Continuous <Continuous>  oxytocin Infusion 333.333 milliUNIT(s)/Min (1000 mL/Hr) IV Continuous <Continuous>    MEDICATIONS  (PRN):  diphenhydrAMINE 25 milliGRAM(s) Oral every 6 hours PRN Pruritus  lanolin Ointment 1 Application(s) Topical every 6 hours PRN Sore Nipples  magnesium hydroxide Suspension 30 milliLiter(s) Oral two times a day PRN Constipation  oxyCODONE    IR 5 milliGRAM(s) Oral every 3 hours PRN Moderate to Severe Pain (4-10)  oxyCODONE    IR 5 milliGRAM(s) Oral once PRN Moderate to Severe Pain (4-10)  simethicone 80 milliGRAM(s) Chew every 4 hours PRN Gas

## 2022-12-10 VITALS
HEART RATE: 81 BPM | SYSTOLIC BLOOD PRESSURE: 120 MMHG | RESPIRATION RATE: 18 BRPM | TEMPERATURE: 98 F | OXYGEN SATURATION: 96 % | DIASTOLIC BLOOD PRESSURE: 82 MMHG

## 2023-02-15 ENCOUNTER — APPOINTMENT (OUTPATIENT)
Dept: SURGERY | Facility: CLINIC | Age: 38
End: 2023-02-15
Payer: COMMERCIAL

## 2023-02-15 VITALS
OXYGEN SATURATION: 98 % | BODY MASS INDEX: 30.73 KG/M2 | DIASTOLIC BLOOD PRESSURE: 76 MMHG | HEART RATE: 76 BPM | SYSTOLIC BLOOD PRESSURE: 114 MMHG | RESPIRATION RATE: 18 BRPM | WEIGHT: 180 LBS | HEIGHT: 64 IN

## 2023-02-15 DIAGNOSIS — Z78.9 OTHER SPECIFIED HEALTH STATUS: ICD-10-CM

## 2023-02-15 DIAGNOSIS — Z83.71 FAMILY HISTORY OF COLONIC POLYPS: ICD-10-CM

## 2023-02-15 DIAGNOSIS — K64.5 PERIANAL VENOUS THROMBOSIS: ICD-10-CM

## 2023-02-15 DIAGNOSIS — K60.2 ANAL FISSURE, UNSPECIFIED: ICD-10-CM

## 2023-02-15 DIAGNOSIS — Z80.0 FAMILY HISTORY OF MALIGNANT NEOPLASM OF DIGESTIVE ORGANS: ICD-10-CM

## 2023-02-15 PROCEDURE — 99204 OFFICE O/P NEW MOD 45 MIN: CPT | Mod: 25

## 2023-02-15 PROCEDURE — 46600 DIAGNOSTIC ANOSCOPY SPX: CPT

## 2023-02-15 RX ORDER — ELASTIC BANDAGE 2"X2.2YD
BANDAGE TOPICAL
Refills: 0 | Status: ACTIVE | COMMUNITY

## 2023-02-15 NOTE — CONSULT LETTER
[Dear  ___] : Dear ~BLANCA, [Consult Letter:] : I had the pleasure of evaluating your patient, [unfilled]. [Please see my note below.] : Please see my note below. [Consult Closing:] : Thank you very much for allowing me to participate in the care of this patient.  If you have any questions, please do not hesitate to contact me. [Sincerely,] : Sincerely, [FreeTextEntry2] : Dr Christel Villareal [FreeTextEntry3] : Ayanna Dong M.D., F.A.C.S., F.AMANDA.S.C.R.S.\par Assistant Professor of Surgery\par [employer] [facility]\par \par

## 2023-02-15 NOTE — ASSESSMENT
[FreeTextEntry1] : 37-year-old female with episode of anorectal pain that is currently improving.  On exam she has an anal fissure and internal thrombosed hemorrhoids.  I prescribed nitroglycerin ointment and gave instructions.  She is hesitant to use the ointment currently as her pain is improving and she is breast-feeding.  I discussed high water intake and high-fiber diet to prevent further episodes in the future.  \par Follow-up with GI to decide on timing of her first screening colonoscopy.\par RTO as needed.\par

## 2023-02-15 NOTE — HISTORY OF PRESENT ILLNESS
[FreeTextEntry1] : Yen Draper is a 36 y/o female here for consultation, possible fissure\par \par The patient reports that she developed anorectal pain three weeks ago after a hard BM.  The pain was lingering for 6 to 7 hours after a BM.  The pain was associated with burning sensation. Reports straining, and BRBPR  spotting into tp.  Last time she noticed bleeding was 3 weeks ago. Patient c/o swollen tissue, not reducible.  Soft BMs daily takes fiber on her diet. Currently breast  feeding.  The pain currently feels a lot better.  She saw her GI doctor Dr Villareal who suspected a fissure.\par \par Abdominal history of  9 weeks ago. Family history of Father- colon polyp and  Uncle -colon cancer.\par \par Never had a colonoscopy.\par \par \par \par

## 2023-02-15 NOTE — PHYSICAL EXAM
[FreeTextEntry1] : This is a 37 year-old well-developed female in no apparent distress.\par \par HEENT normocephalic, anicteric, external ears normal bilaterally, EOMs intact.\par \par Cardiac - regular rate and rhythm.\par \par Abdomen soft, nontender, nondistended, no masses. No hepatosplenomegaly.\par \par No inguinal lymphadenopathy bilaterally.\par \par Examination of the perineum reveals small external hemorrhoids.  There is a small anterior midline anal fissure.  Digital rectal examination reveals no masses, there is minimal sphincter spasm. \par Anoscopy reveals small internal hemorrhoids, there is thrombosis of the anterior internal hemorrhoids.  \par \par Neuro-cranial nerves grossly intact. Normal gait.\par \par Psychiatric-oriented to time place and person. Good understanding of conversation.

## 2023-02-16 RX ORDER — NITROGLYCERIN 20 MG/G
2 OINTMENT TOPICAL
Qty: 1 | Refills: 3 | Status: ACTIVE | COMMUNITY
Start: 2023-02-15 | End: 1900-01-01

## 2023-08-15 NOTE — OB PST NOTE - CURRENT PREGNANCY COMPLICATIONS, OB PROFILE
c/o COVID pos. Pt went to  this morning for HA, nausea, SOB, and fever, tested positive for COVID. Pt presents to ED for worsening of s/s. Pt denies dizziness, V/D, CP, palpitations. Pt Aox4, speaking coherently, respirations even and unlabored on RA, skin warm and dry.
None

## 2024-04-05 ENCOUNTER — APPOINTMENT (OUTPATIENT)
Dept: OBGYN | Facility: CLINIC | Age: 39
End: 2024-04-05
Payer: COMMERCIAL

## 2024-04-05 PROCEDURE — 99396 PREV VISIT EST AGE 40-64: CPT

## 2024-04-05 PROCEDURE — 99459 PELVIC EXAMINATION: CPT

## 2024-04-05 PROCEDURE — 96127 BRIEF EMOTIONAL/BEHAV ASSMT: CPT

## 2025-04-08 NOTE — OB PROVIDER H&P - BIRTH SEX
----- Message from Sandor CASTRO sent at 4/8/2025  9:11 AM CDT -----  Regarding: Service to Anticoagulation Clinic Order (9098)  Please note that orders for the Anticoagulation Center are valid for one (1) year.  The order for the patient referenced above needs to be renewed.      The patient's next INR is due 4/21/2025.      Please enter a \"9098 Service to Anticoagulation Clinic Order\" in Epic for the Anticoagulation Center to continue monitoring this patient's warfarin therapy.       Please make sure that under the \"Responsible Group / Send INR Reminders To\" prompt that \"[facility]\" ([id number]) is entered.       Thank you.  
Female

## 2025-05-19 ENCOUNTER — NON-APPOINTMENT (OUTPATIENT)
Age: 40
End: 2025-05-19

## 2025-06-09 NOTE — OB RN DELIVERY SUMMARY - [id number]
Xray initial interpretation: chest xray - no acute cardiopulmonary abnormality   Official radiology read pending - We will only notify you if there needs to be a change in your treatment plan.     Take full course of Azithromycin as prescribed  Eat yogurt with live and active cultures and/or take a probiotic at least 3 hours before or after antibiotic dose. Monitor stool for diarrhea and/or blood. If this occurs, contact primary care doctor ASAP.   Take Medrol dose pack as prescribed - take in morning with food  Take Albuterol inhaler as needed for cough.     Take over the counter Mucinex during the day  Take over the counter cough suppressant at night  Fluids and rest (Warm water with honey and lemon)  Tylenol/Ibuprofen for pain fever    Follow up with PCP in 3-5 days.  Proceed to  ER if symptoms worsen.    If tests are performed, our office will contact you with results only if changes need to made to the care plan discussed with you at the visit. You can review your full results on [facility] My[facility].   0.03